# Patient Record
Sex: MALE | Race: WHITE | NOT HISPANIC OR LATINO | Employment: OTHER | URBAN - METROPOLITAN AREA
[De-identification: names, ages, dates, MRNs, and addresses within clinical notes are randomized per-mention and may not be internally consistent; named-entity substitution may affect disease eponyms.]

---

## 2023-03-08 ENCOUNTER — NURSING HOME VISIT (OUTPATIENT)
Dept: POST ACUTE CARE | Facility: EXTERNAL LOCATION | Age: 68
End: 2023-03-08
Payer: MEDICARE

## 2023-03-08 DIAGNOSIS — I10 HYPERTENSION, UNSPECIFIED TYPE: ICD-10-CM

## 2023-03-08 DIAGNOSIS — Z86.718 PERSONAL HISTORY OF OTHER VENOUS THROMBOSIS AND EMBOLISM: ICD-10-CM

## 2023-03-08 DIAGNOSIS — L44.0 PITYRIASIS RUBRA PILARIS: ICD-10-CM

## 2023-03-08 DIAGNOSIS — E13.9 OTHER SPECIFIED DIABETES MELLITUS WITHOUT COMPLICATION, WITHOUT LONG-TERM CURRENT USE OF INSULIN (MULTI): ICD-10-CM

## 2023-03-08 DIAGNOSIS — R53.1 WEAKNESS: Primary | ICD-10-CM

## 2023-03-08 DIAGNOSIS — J44.9 CHRONIC OBSTRUCTIVE PULMONARY DISEASE, UNSPECIFIED COPD TYPE (MULTI): ICD-10-CM

## 2023-03-08 PROCEDURE — 99309 SBSQ NF CARE MODERATE MDM 30: CPT | Performed by: NURSE PRACTITIONER

## 2023-03-08 NOTE — LETTER
Subjective  Chief complaint: Levi Cornell is a 68 y.o. male who is a acute skilled care patient being seen and evaluated for weakness    HPI:  3/8/23 Patient with history of DVT, COPD, BPH, adult failure to thrive, history of multiple bloodstream infections, anemia, diabetes admitted to skilled nursing facility for therapy due to weakness after recent hospitalization for worsening pityriasis rubra pilaris.  He was seen by dermatology and treated with steroid and Benadryl.  He continues to have burning and itching to his skin but states its not as bad as prior to hospitalization.  Patient was seen by PT who recommended therapy at skilled nursing facility.  Patient requires assist with ADLs and transfers.  Denies constitutional symptoms.      Objective  Vital signs: 117/66, 97.1, 55, 18, 93%, blood sugar 109  Physical Exam  Constitutional:       General: He is not in acute distress.  Eyes:      Extraocular Movements: Extraocular movements intact.   Cardiovascular:      Rate and Rhythm: Regular rhythm.   Pulmonary:      Effort: Pulmonary effort is normal.      Breath sounds: Normal breath sounds.   Abdominal:      General: Bowel sounds are normal.      Palpations: Abdomen is soft.   Genitourinary:     Comments: Iverson catheter  Musculoskeletal:      Cervical back: Neck supple.      Right lower leg: No edema.      Left lower leg: No edema.   Neurological:      Mental Status: He is alert.   Psychiatric:         Mood and Affect: Mood normal.         Behavior: Behavior is cooperative.         Assessment/Plan  Problem List Items Addressed This Visit       COPD (chronic obstructive pulmonary disease) (CMS/HCC)     Stable  On room air  Continue breathing treatments         Diabetes (CMS/[facility])     Metformin  Monitor Glucoscan  Fasting blood glucose at goal         Hypertension     Controlled  Continue antihypertensives   Monitor blood pressure         Personal history of other venous thrombosis and embolism     Currently not on  anticoagulant  Monitor         Pityriasis rubra pilaris     Follow-up with dermatology  Continue steroid  Continue antihistamine         Weakness - Primary     Therapy to eval and treat          Medications, treatments, and labs reviewed  Continue medications and treatments as listed in PCC

## 2023-03-12 PROBLEM — Z86.718 PERSONAL HISTORY OF OTHER VENOUS THROMBOSIS AND EMBOLISM: Status: ACTIVE | Noted: 2023-03-12

## 2023-03-12 PROBLEM — J44.9 COPD (CHRONIC OBSTRUCTIVE PULMONARY DISEASE) (MULTI): Status: ACTIVE | Noted: 2023-03-12

## 2023-03-12 PROBLEM — I10 HYPERTENSION: Status: ACTIVE | Noted: 2023-03-12

## 2023-03-12 PROBLEM — L44.0 PITYRIASIS RUBRA PILARIS: Status: ACTIVE | Noted: 2023-03-12

## 2023-03-12 PROBLEM — E11.9 DIABETES (MULTI): Status: ACTIVE | Noted: 2023-03-12

## 2023-03-12 PROBLEM — R53.1 WEAKNESS: Status: ACTIVE | Noted: 2023-03-12

## 2023-03-12 NOTE — PROGRESS NOTES
Subjective   Chief complaint: Levi Cornell is a 68 y.o. male who is a acute skilled care patient being seen and evaluated for weakness    HPI:  3/8/23 Patient with history of DVT, COPD, BPH, adult failure to thrive, history of multiple bloodstream infections, anemia, diabetes admitted to skilled nursing facility for therapy due to weakness after recent hospitalization for worsening pityriasis rubra pilaris.  He was seen by dermatology and treated with steroid and Benadryl.  He continues to have burning and itching to his skin but states its not as bad as prior to hospitalization.  Patient was seen by PT who recommended therapy at skilled nursing facility.  Patient requires assist with ADLs and transfers.  Denies constitutional symptoms.      Objective   Vital signs: 117/66, 97.1, 55, 18, 93%, blood sugar 109  Physical Exam  Constitutional:       General: He is not in acute distress.  Eyes:      Extraocular Movements: Extraocular movements intact.   Cardiovascular:      Rate and Rhythm: Regular rhythm.   Pulmonary:      Effort: Pulmonary effort is normal.      Breath sounds: Normal breath sounds.   Abdominal:      General: Bowel sounds are normal.      Palpations: Abdomen is soft.   Genitourinary:     Comments: Iverson catheter  Musculoskeletal:      Cervical back: Neck supple.      Right lower leg: No edema.      Left lower leg: No edema.   Neurological:      Mental Status: He is alert.   Psychiatric:         Mood and Affect: Mood normal.         Behavior: Behavior is cooperative.         Assessment/Plan   Problem List Items Addressed This Visit       COPD (chronic obstructive pulmonary disease) (CMS/HCC)     Stable  On room air  Continue breathing treatments         Diabetes (CMS/[facility])     Metformin  Monitor Glucoscan  Fasting blood glucose at goal         Hypertension     Controlled  Continue antihypertensives   Monitor blood pressure         Personal history of other venous thrombosis and embolism     Currently not  on anticoagulant  Monitor         Pityriasis rubra pilaris     Follow-up with dermatology  Continue steroid  Continue antihistamine         Weakness - Primary     Therapy to eval and treat          Medications, treatments, and labs reviewed  Continue medications and treatments as listed in PCC